# Patient Record
Sex: MALE | Race: WHITE | URBAN - METROPOLITAN AREA
[De-identification: names, ages, dates, MRNs, and addresses within clinical notes are randomized per-mention and may not be internally consistent; named-entity substitution may affect disease eponyms.]

---

## 2020-06-09 ENCOUNTER — NURSE TRIAGE (OUTPATIENT)
Dept: NURSING | Facility: CLINIC | Age: 60
End: 2020-06-09

## 2020-06-09 NOTE — TELEPHONE ENCOUNTER
"Mitul is calling, believes he has a spider bite on his back.   Thought it was a deer tick but states it was all the sudden a hard area, welt like. 2 inches in diameter.   Reddish.   Pt then stated it is more like a small laceration about 1/8\" big.   Raised, hard, very pink/red, no streaks, warm to the touch.     Rn advised of concern for possible infection.  Patient is currently in [place].  Pt was advised to be seen within 4 hours.       RN offered phone visit with urgent care provider.  Wife stated she will call the urgent care near them to see if he can be seen locally, if unable to be see at the local urgent care they will call back to schedule phone visit with  Urgent Care provider rosemarie.    Judith Venegas RN   [facility] RN Triage     COVID 19 Nurse Triage Plan/Patient Instructions    Please be aware that novel coronavirus (COVID-19) may be circulating in the community. If you develop symptoms such as fever, cough, or SOB or if you have concerns about the presence of another infection including coronavirus (COVID-19), please contact your health care provider or visit [email or web address].     Disposition/Instructions    Patient to schedule a Virtual Visit with provider. Reference Visit Selection Guide.    Thank you for taking steps to prevent the spread of this virus.  o Limit your contact with others.  o Wear a simple mask to cover your cough.  o Wash your hands well and often.    Resources    [facility]: About COVID-19: [email or web address]    CDC: What to Do If You're Sick: www.cdc.gov/coronavirus/2019-ncov/about/steps-when-sick.html    CDC: Ending Home Isolation: www.cdc.gov/coronavirus/2019-ncov/hcp/disposition-in-home-patients.html     CDC: Caring for Someone: www.cdc.gov/coronavirus/2019-ncov/if-you-are-sick/care-for-someone.html     [facility]: Interim Guidance for Hospital Discharge to Home: www.[email or web address]/diseases/coronavirus/hcp/hospdischarge.pdf    [facility] [place] clinical trials (COVID-19 research studies): [email or web address].[facility]-clinical-trials     Below are the COVID-19 hotlines at the [facility] ([facility]). Interpreters are available.   o For health questions: Call 719-396-1133 or 1-186.374.7336 (7 a.m. to 7 p.m.)  o For questions about schools and childcare: Call 078-933-3823 or 1-950.599.2212 (7 a.m. to 7 p.m.)                       Additional Information    Negative: [1] Major bleeding (e.g., actively dripping or spurting) AND [2] can't be stopped    Negative: Amputation    Negative: Shock suspected (e.g., cold/pale/clammy skin, too weak to stand, low BP, rapid pulse)    Negative: [1] Knife wound (or other possibly deep cut) AND [2] to chest, abdomen, back, neck, or head    Negative: [1] Cutter (self-mutilator) AND [2] suicidal or out-of-control    Negative: Sounds like a life-threatening emergency to the triager    Negative: [1] Animal bite AND [2] broken skin    Negative: [1] Human bite AND [2] broken skin    Negative: [1] Bleeding AND [2] won't stop after 10 minutes of direct pressure (using correct technique)    Negative: Skin is split open or gaping (or length > 1/2 inch or 12 mm on the skin, 1/4 inch or 6 mm on the face)    Negative: [1] Deep cut AND [2] can see bone or tendons    Negative: Sensation of something in the wound (i.e., retained object in wound)    Negative: [1] Dirt in the wound AND [2] not removed with 15 minutes of scrubbing    Negative: Wound causes numbness (i.e., loss of sensation)    Negative: Wound causes weakness (i.e., decreased ability to move hand, finger, toe)    Negative: [1] SEVERE pain AND [2] not improved 2 hours after pain medicine    [1] Looks infected AND [2] large red area (>2 inches or 5 cm) or streak    Protocols used: CUTS AND [id number]